# Patient Record
Sex: MALE | Race: WHITE | NOT HISPANIC OR LATINO | Employment: OTHER | URBAN - METROPOLITAN AREA
[De-identification: names, ages, dates, MRNs, and addresses within clinical notes are randomized per-mention and may not be internally consistent; named-entity substitution may affect disease eponyms.]

---

## 2017-04-14 ENCOUNTER — IMPORTED ENCOUNTER (OUTPATIENT)
Dept: URBAN - METROPOLITAN AREA CLINIC 43 | Facility: CLINIC | Age: 82
End: 2017-04-14

## 2017-04-14 PROBLEM — H40.011: Noted: 2017-04-14

## 2017-04-24 NOTE — PATIENT DISCUSSION
Continue: PreserVision AREDS 2 (vit c,d-tv-caejc-lutein-zeaxan): capsule: 330-673-84-7 mg-unit-mg-mg 1 capsule once a day as directed by mouth

## 2017-04-24 NOTE — PATIENT DISCUSSION
"PT C/O ""FUNNY FEELING OVER EYES"" AND LASTED HALF A DAY - NO NEW EYE PROBLEMS FROM THIS. POSS TIA. REFER BK TO DR Opal López FOR TIA WORKUP. "

## 2017-10-16 NOTE — PATIENT DISCUSSION
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 ADVISED PT TO CONTROL BLOOD PRESSURE, CHOLESTEROL, AND SUGAR. WILL FOLLOW UP WITH DILATED EYE EXAMS TO MONITOR. RETURN AS SCHEDULED.

## 2017-10-16 NOTE — PATIENT DISCUSSION
Continue: PreserVision AREDS 2 (vit c,a-ti-vqgmp-lutein-zeaxan): capsule: 036-997-45-7 mg-unit-mg-mg 1 capsule once a day as directed by mouth

## 2017-11-27 ENCOUNTER — IMPORTED ENCOUNTER (OUTPATIENT)
Dept: URBAN - METROPOLITAN AREA CLINIC 43 | Facility: CLINIC | Age: 82
End: 2017-11-27

## 2017-11-27 PROBLEM — H40.011: Noted: 2017-11-27

## 2017-11-27 PROBLEM — H26.493: Noted: 2017-11-27

## 2018-05-18 NOTE — PATIENT DISCUSSION
BLEPHARITIS, OU: PRESCRIBE WARM COMPRESSES AND EYELID SCRUBS QD-BID, ARTIFICIAL TEARS BID-QID, THE DAILY INTAKE OF OMEGA-3 FATTY ACIDS AND LID SCRUBS_. WILL CONSIDER LIPIFLOW TREATMENT NEXT VISIT IF NOT RESPONSIVE TO TREATMENT OR IF SYMPTOMS PERSIST. RETURN FOR FOLLOW-UP AS SCHEDULED.

## 2018-05-18 NOTE — PATIENT DISCUSSION
Continue: PreserVision AREDS 2 (vit c,x-un-yofqu-lutein-zeaxan): capsule: 468-254-41-1 mg-unit-mg-mg 1 capsule once a day as directed by mouth

## 2018-05-18 NOTE — PATIENT DISCUSSION
Continue: Thera Tears Nutrition (om-3-epa-dha-fish oil-flax-e): capsule: [phone] ln-gw-mv-unit

## 2018-11-12 NOTE — PATIENT DISCUSSION
Continue: Thera Tears Nutrition (om-3-epa-dha-fish oil-flax-e): capsule: [phone] yg-bx-pk-unit

## 2018-11-12 NOTE — PATIENT DISCUSSION
Continue: PreserVision AREDS 2 (vit c,i-mx-vrnui-lutein-zeaxan): capsule: 583-454-58-0 mg-unit-mg-mg 1 capsule once a day as directed by mouth

## 2018-12-06 ENCOUNTER — IMPORTED ENCOUNTER (OUTPATIENT)
Dept: URBAN - METROPOLITAN AREA CLINIC 43 | Facility: CLINIC | Age: 83
End: 2018-12-06

## 2018-12-06 PROBLEM — H26.493: Noted: 2018-12-06

## 2018-12-06 PROBLEM — H43.811: Noted: 2018-12-06

## 2018-12-06 PROBLEM — H40.011: Noted: 2018-12-06

## 2019-01-28 ENCOUNTER — IMPORTED ENCOUNTER (OUTPATIENT)
Dept: URBAN - METROPOLITAN AREA CLINIC 43 | Facility: CLINIC | Age: 84
End: 2019-01-28

## 2019-01-28 PROBLEM — H26.493: Noted: 2019-01-28

## 2019-04-11 ENCOUNTER — APPOINTMENT (RX ONLY)
Dept: URBAN - METROPOLITAN AREA CLINIC 124 | Facility: CLINIC | Age: 84
Setting detail: DERMATOLOGY
End: 2019-04-11

## 2019-04-11 DIAGNOSIS — L82.1 OTHER SEBORRHEIC KERATOSIS: ICD-10-CM

## 2019-04-11 DIAGNOSIS — D485 NEOPLASM OF UNCERTAIN BEHAVIOR OF SKIN: ICD-10-CM

## 2019-04-11 DIAGNOSIS — D18.0 HEMANGIOMA: ICD-10-CM

## 2019-04-11 DIAGNOSIS — L81.4 OTHER MELANIN HYPERPIGMENTATION: ICD-10-CM

## 2019-04-11 PROBLEM — D18.01 HEMANGIOMA OF SKIN AND SUBCUTANEOUS TISSUE: Status: ACTIVE | Noted: 2019-04-11

## 2019-04-11 PROBLEM — L29.8 OTHER PRURITUS: Status: ACTIVE | Noted: 2019-04-11

## 2019-04-11 PROBLEM — L85.3 XEROSIS CUTIS: Status: ACTIVE | Noted: 2019-04-11

## 2019-04-11 PROBLEM — D48.5 NEOPLASM OF UNCERTAIN BEHAVIOR OF SKIN: Status: ACTIVE | Noted: 2019-04-11

## 2019-04-11 PROCEDURE — 11102 TANGNTL BX SKIN SINGLE LES: CPT

## 2019-04-11 PROCEDURE — ? BIOPSY BY SHAVE METHOD

## 2019-04-11 PROCEDURE — 99203 OFFICE O/P NEW LOW 30 MIN: CPT | Mod: 25

## 2019-04-11 PROCEDURE — ? COUNSELING

## 2019-04-11 ASSESSMENT — LOCATION DETAILED DESCRIPTION DERM
LOCATION DETAILED: SUPERIOR THORACIC SPINE
LOCATION DETAILED: LEFT DISTAL POSTERIOR UPPER ARM
LOCATION DETAILED: INFERIOR THORACIC SPINE

## 2019-04-11 ASSESSMENT — LOCATION ZONE DERM
LOCATION ZONE: ARM
LOCATION ZONE: TRUNK

## 2019-04-11 ASSESSMENT — LOCATION SIMPLE DESCRIPTION DERM
LOCATION SIMPLE: UPPER BACK
LOCATION SIMPLE: LEFT UPPER ARM

## 2019-04-11 NOTE — PROCEDURE: BIOPSY BY SHAVE METHOD
Silver Nitrate Text: The wound bed was treated with silver nitrate after the biopsy was performed.
Billing Type: [employer]
Curettage Text: The wound bed was treated with curettage after the biopsy was performed.
Anesthesia Type: 1% lidocaine without epinephrine
Biopsy Method: 15 blade
Additional Anesthesia Volume In Cc (Will Not Render If 0): 0
Was A Bandage Applied: Yes
Post-Care Instructions: I reviewed with the patient in detail post-care instructions. Patient is to keep the biopsy site dry overnight, and then apply bacitracin twice daily until healed. Patient may apply hydrogen peroxide soaks to remove any crusting.
Notification Instructions: Patient will be notified of biopsy results. However, patient instructed to call the office if not contacted within 2 weeks.
Wound Care: Petrolatum
Lab: [facility] [place] [facility] [place]
Destruction After The Procedure: No
Anesthesia Volume In Cc (Will Not Render If 0): 1
Consent: The provider's intent is to obtain a tissue sample solely for diagnostic purposes. Written consent to obtain tissue sample was obtained and risks were reviewed including but not limited to scarring, infection, bleeding, scabbing, incomplete removal, nerve damage and allergy to anesthesia.
Cryotherapy Text: The wound bed was treated with cryotherapy after the biopsy was performed.
Electrodesiccation Text: The wound bed was treated with electrodesiccation after the biopsy was performed.
Lab Facility: [address] [facility]
Dressing: pressure dressing with telfa
Type Of Destruction Used: Curettage
Biopsy Type: H and E
Electrodesiccation And Curettage Text: The wound bed was treated with electrodesiccation and curettage after the biopsy was performed.
Body Location Override (Optional - Billing Will Still Be Based On Selected Body Map Location If Applicable): left posterior upper arm
Depth Of Biopsy: dermis
Detail Level: Simple
Hemostasis: Drysol

## 2019-04-26 ENCOUNTER — APPOINTMENT (RX ONLY)
Dept: URBAN - METROPOLITAN AREA CLINIC 124 | Facility: CLINIC | Age: 84
Setting detail: DERMATOLOGY
End: 2019-04-26

## 2019-04-26 PROBLEM — C44.629 SQUAMOUS CELL CARCINOMA OF SKIN OF LEFT UPPER LIMB, INCLUDING SHOULDER: Status: ACTIVE | Noted: 2019-04-26

## 2019-04-26 PROCEDURE — ? COUNSELING

## 2019-04-26 PROCEDURE — ? CURETTAGE AND DESTRUCTION

## 2019-04-26 PROCEDURE — ? PRESCRIPTION

## 2019-04-26 PROCEDURE — 17262 DSTRJ MAL LES T/A/L 1.1-2.0: CPT

## 2019-04-26 RX ORDER — IMIQUIMOD 50 MG/G
CREAM TOPICAL
Qty: 1 | Refills: 0 | Status: ERX | COMMUNITY
Start: 2019-04-26

## 2019-04-26 RX ADMIN — IMIQUIMOD: 50 CREAM TOPICAL at 12:53

## 2019-04-26 NOTE — PROCEDURE: CURETTAGE AND DESTRUCTION
Add Intralesional Injection: No
Number Of Curettages: 2
Render Post-Care Instructions In Note?: yes
Detail Level: Detailed
Bill As A Line Item Or As Units: Line Item
Post-Care Instructions: I reviewed with the patient in detail post-care instructions. Patient is to keep the area dry for 48 hours, and not to engage in any swimming until the area is healed. Should the patient develop any fevers, chills, bleeding, severe pain patient will contact the office immediately.
Anesthesia Type: 1% lidocaine without epinephrine
Concentration (Mg/Ml Or Millions Of Plaque Forming Units/Cc): 0.01
Anesthesia Volume In Cc: 1
Size Of Lesion After Curettage: 1.5
Body Location Override (Optional - Billing Will Still Be Based On Selected Body Map Location If Applicable): left posterior upper arm
Additional Information: (Optional): The wound was cleaned, and a pressure dressing was applied. The patient received detailed post-op instructions.
What Was Performed First?: Curettage
Consent was obtained from the patient. The risks, benefits and alternatives to therapy were discussed in detail. Specifically, the risks of infection, scarring, bleeding, prolonged wound healing, nerve injury, incomplete removal, allergy to anesthesia and recurrence were addressed. Alternatives to [facility], such as: surgical removal and XRT were also discussed. Prior to the procedure, the treatment site was clearly identified and confirmed by the patient. All components of Universal Protocol/PAUSE Rule completed.
Size Of Lesion In Cm: 1.1
Cautery Type: electrodesiccation

## 2019-07-29 ENCOUNTER — APPOINTMENT (RX ONLY)
Dept: URBAN - METROPOLITAN AREA CLINIC 124 | Facility: CLINIC | Age: 84
Setting detail: DERMATOLOGY
End: 2019-07-29

## 2019-11-11 NOTE — PATIENT DISCUSSION
Continue: Thera Tears Nutrition (om-3-epa-dha-fish oil-flax-e): capsule: [phone] qs-wg-mm-unit

## 2019-11-11 NOTE — PATIENT DISCUSSION
Stopped Today: PreserVision AREDS 2 (vit c,x-sy-mmxzn-lutein-zeaxan): capsule: 263-122-04-9 mg-unit-mg-mg 1 capsule once a day as directed by mouth

## 2020-01-07 NOTE — PATIENT DISCUSSION
Continue: Thera Tears Nutrition (om-3-epa-dha-fish oil-flax-e): capsule: [phone] lk-qk-nr-unit

## 2020-04-19 ASSESSMENT — KERATOMETRY
OD_AXISANGLE2_DEGREES: 97
OD_AXISANGLE2_DEGREES: 16
OS_K1POWER_DIOPTERS: 44
OS_K1POWER_DIOPTERS: 44.75
OD_K1POWER_DIOPTERS: 44
OD_K2POWER_DIOPTERS: 44.5
OD_AXISANGLE_DEGREES: 106
OS_AXISANGLE_DEGREES: 90
OD_K2POWER_DIOPTERS: 44.25
OS_AXISANGLE2_DEGREES: 180
OD_K1POWER_DIOPTERS: 44.5
OS_AXISANGLE2_DEGREES: 127
OS_AXISANGLE_DEGREES: 37
OD_AXISANGLE_DEGREES: 7
OS_K2POWER_DIOPTERS: 44.5
OS_K2POWER_DIOPTERS: 44.75

## 2020-04-19 ASSESSMENT — TONOMETRY
OD_IOP_MMHG: 11.0
OS_IOP_MMHG: 12.0
OD_IOP_MMHG: 13.0
OD_IOP_MMHG: 13.0
OS_IOP_MMHG: 12.0
OS_IOP_MMHG: 12.0
OS_IOP_MMHG: 15.0
OD_IOP_MMHG: 12.0

## 2020-04-19 ASSESSMENT — VISUAL ACUITY
OD_CC: 20/40+1
OS_OTHER: <20/400.
OD_CC: J1
OS_CC: 20/70-2
OD_CC: 20/25
OS_CC: 20/40 -
OS_CC: J1+
OS_CC: 20/30
OD_CC: 20/30
OS_CC: 20/30-2
OD_OTHER: 20/80.
OD_CC: 20/25 -1
OS_PH: 20/50+2
OD_CC: J2-
OS_CC: J1+/-

## 2020-10-02 ENCOUNTER — PREPPED CHART (OUTPATIENT)
Dept: URBAN - METROPOLITAN AREA CLINIC 32 | Facility: CLINIC | Age: 85
End: 2020-10-02

## 2020-10-02 ENCOUNTER — ESTABLISHED COMPREHENSIVE EXAM (OUTPATIENT)
Dept: URBAN - METROPOLITAN AREA CLINIC 32 | Facility: CLINIC | Age: 85
End: 2020-10-02

## 2020-10-02 DIAGNOSIS — H40.013: ICD-10-CM

## 2020-10-02 DIAGNOSIS — H04.123: ICD-10-CM

## 2020-10-02 DIAGNOSIS — H01.02A: ICD-10-CM

## 2020-10-02 DIAGNOSIS — Z96.1: ICD-10-CM

## 2020-10-02 DIAGNOSIS — H01.02B: ICD-10-CM

## 2020-10-02 PROCEDURE — 92014 COMPRE OPH EXAM EST PT 1/>: CPT

## 2020-10-02 PROCEDURE — 92015 DETERMINE REFRACTIVE STATE: CPT

## 2020-10-02 PROCEDURE — 92133 CPTRZD OPH DX IMG PST SGM ON: CPT

## 2020-10-02 ASSESSMENT — VISUAL ACUITY
OS_CC: 20/60+1
OD_CC: 20/40
OD_CC: J2
OS_CC: J3

## 2020-10-02 ASSESSMENT — KERATOMETRY
OS_K2POWER_DIOPTERS: 44.75
OD_AXISANGLE2_DEGREES: 16
OS_AXISANGLE_DEGREES: 90
OD_K2POWER_DIOPTERS: 44.25
OS_K1POWER_DIOPTERS: 44.75
OD_AXISANGLE_DEGREES: 106
OD_K1POWER_DIOPTERS: 44.5
OS_AXISANGLE2_DEGREES: 180

## 2020-10-02 ASSESSMENT — TONOMETRY
OD_IOP_MMHG: 11
OS_IOP_MMHG: 11

## 2021-01-13 NOTE — PATIENT DISCUSSION
Continue: Thera Tears Nutrition (om-3-epa-dha-fish oil-flax-e): capsule: [phone] xs-cf-on-unit

## 2021-02-19 NOTE — PATIENT DISCUSSION
Prescribe warm compress daily. Use of preservative free artificial tears encouraged. Begin daily warm compresses. With your eyelids closed, apply a warm washcloth and use gentle pressure to massage your eyelids. Reheat the cloth as needed. Warm water is fine; do not use hot water. This should last about 5 minutes. The use of a Ronald mask or Optase heat mask is an alternative to washcloth and encouraged.

## 2021-02-19 NOTE — PATIENT DISCUSSION
Continue: Thera Tears Nutrition (om-3-epa-dha-fish oil-flax-e): capsule: [phone] bi-ia-ex-unit

## 2021-08-04 NOTE — PATIENT DISCUSSION
Continue: Thera Tears Nutrition (om-3-epa-dha-fish oil-flax-e): capsule: [phone] ul-zw-iw-unit

## 2022-03-18 NOTE — PATIENT DISCUSSION
Patient given new glasses today. Add power lowered due to complaint of eye strain and difficulty with current pair of glasses.